# Patient Record
Sex: FEMALE | Race: WHITE | NOT HISPANIC OR LATINO
[De-identification: names, ages, dates, MRNs, and addresses within clinical notes are randomized per-mention and may not be internally consistent; named-entity substitution may affect disease eponyms.]

---
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
## 2024-10-15 NOTE — REVIEW OF SYSTEMS
[Headache] : headache [Chills] : chills [Nasal Discharge] : no nasal discharge [Ear Pain] : no ear pain [Sore Throat] : sore throat [Negative] : Genitourinary DC instructions